# Patient Record
Sex: MALE | Race: OTHER | HISPANIC OR LATINO | URBAN - METROPOLITAN AREA
[De-identification: names, ages, dates, MRNs, and addresses within clinical notes are randomized per-mention and may not be internally consistent; named-entity substitution may affect disease eponyms.]

---
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
## 2021-03-02 NOTE — ED PROVIDER NOTE - SECONDARY DIAGNOSIS.
DOS: 3/2/21    Pt seen in consultation for Geovany Jones, present with Hx chronic sore throat for the past few years, felt at level of larynx and also under R jaw, becoming more severe, increasing hoarseness, symptoms worse after heavy voice use.No throat clearing or mucus. Pt still has tonsils. No acute throat infections. No allergies, but seasonal sinus infections, treated with OTC meds and sometimes ABx if ear is involved. No problems with breathing or swallowing. No asthma or DM. No GERD. + heavy voice use. No dust/irritant/cigarette smoke exposure, + pet exposure.     Past medical, family, and social history reviewed per electronic health record on today's date of service, and there were no changes.    Review of systems including hematologic/lymphatic, respiratory, cardiovascular, constitutional, musculoskeletal, mood, endocrine, eyes, gastrointestinal, genitourinary, integumentary, and neurological systems was negative.    3/2/2021    Vitals:    03/02/21 1216   BP: 118/60   Pulse: 68   Resp: 16       On exam, patient is alert, oriented x3, well-developed, well-nourished, in no apparent distress.  Patient is attentive and responds to questions appropriately.  Voice is not hoarse.    Overall appearance of the head and neck is normal.  Facial symmetry and movement are within normal limits bilaterally, and skin is warm and dry.    Extraocular movement is preserved.    Ears:  Normal pinnae, ear canals, and tympanic membranes bilaterally.  Normal motion of the TM (tympanic membrane) on pneumatic otoscopy.  No TMJ (temporomandibular joint) tenderness.  No mastoid process tenderness.  Speech reception within normal limits.    Nose:  Normal septum, bridge, and nasal mucosa.  No sinus tenderness on percussion.    Oral cavity:  Normal lips, gums, floor of mouth, buccal mucosa, tongue, and hard palate.    Oropharynx:  Normal soft palate, posterior pharyngeal wall, tonsillar fossae and tonsils.    Neck:  No abnormal  masses, thyroid masses or enlargement, lymphadenopathy, crepitus or tenderness.  Trachea midline.    Chest:  No respiratory distress, stridor, or wheezing.    Cardiovascular:  No peripheral edema, normal peripheral perfusion, no pallor.    Neurologic:  Cranial nerve function grossly within normal limits.    Procedure: Both nasal cavities were anesthetized with topical lidocaine and Afrin nasal spray and bilateral rhinoscopies and laryngoscopy were performed.     Posterior nasal cavity including nasopharynx, adenoid beds, and eustachian tube orifices were within normal limits.     Examination of the hypopharynx including base of tongue, epiglottis, aryepiglottic folds, false vocal cords, true vocal cords, pyriform sinuses, and posterior glottic processes were within normal limits.    Assessment and Plan: normal exam for pt with symptoms consistent with chronic laryngitis or voice overuse. Will order U/S neck to investigate, if negative consider speech Rx.   Contusion of left chest wall, initial encounter

## 2022-08-19 ENCOUNTER — EMERGENCY (EMERGENCY)
Facility: HOSPITAL | Age: 22
LOS: 1 days | Discharge: HOME | End: 2022-08-19
Attending: EMERGENCY MEDICINE
Payer: SELF-PAY

## 2022-08-19 VITALS
HEIGHT: 68 IN | TEMPERATURE: 101 F | OXYGEN SATURATION: 98 % | RESPIRATION RATE: 20 BRPM | WEIGHT: 111.99 LBS | HEART RATE: 95 BPM | SYSTOLIC BLOOD PRESSURE: 117 MMHG | DIASTOLIC BLOOD PRESSURE: 79 MMHG

## 2022-08-19 LAB
ALBUMIN SERPL ELPH-MCNC: 4.5 G/DL — SIGNIFICANT CHANGE UP (ref 3.3–5.2)
ALP SERPL-CCNC: 48 U/L — SIGNIFICANT CHANGE UP (ref 40–120)
ALT FLD-CCNC: 23 U/L — SIGNIFICANT CHANGE UP
ANION GAP SERPL CALC-SCNC: 13 MMOL/L — SIGNIFICANT CHANGE UP (ref 5–17)
AST SERPL-CCNC: 26 U/L — SIGNIFICANT CHANGE UP
BASOPHILS # BLD AUTO: 0.02 K/UL — SIGNIFICANT CHANGE UP (ref 0–0.2)
BASOPHILS NFR BLD AUTO: 0.2 % — SIGNIFICANT CHANGE UP (ref 0–2)
BILIRUB SERPL-MCNC: 0.3 MG/DL — LOW (ref 0.4–2)
BUN SERPL-MCNC: 10.2 MG/DL — SIGNIFICANT CHANGE UP (ref 8–20)
CALCIUM SERPL-MCNC: 9.7 MG/DL — SIGNIFICANT CHANGE UP (ref 8.4–10.5)
CHLORIDE SERPL-SCNC: 97 MMOL/L — LOW (ref 98–107)
CO2 SERPL-SCNC: 26 MMOL/L — SIGNIFICANT CHANGE UP (ref 22–29)
CREAT SERPL-MCNC: 1.03 MG/DL — SIGNIFICANT CHANGE UP (ref 0.5–1.3)
EGFR: 105 ML/MIN/1.73M2 — SIGNIFICANT CHANGE UP
EOSINOPHIL # BLD AUTO: 0.02 K/UL — SIGNIFICANT CHANGE UP (ref 0–0.5)
EOSINOPHIL NFR BLD AUTO: 0.2 % — SIGNIFICANT CHANGE UP (ref 0–6)
GLUCOSE SERPL-MCNC: 118 MG/DL — HIGH (ref 70–99)
HADV DNA SPEC QL NAA+PROBE: DETECTED
HCT VFR BLD CALC: 41.1 % — SIGNIFICANT CHANGE UP (ref 39–50)
HGB BLD-MCNC: 13.9 G/DL — SIGNIFICANT CHANGE UP (ref 13–17)
IMM GRANULOCYTES NFR BLD AUTO: 0.3 % — SIGNIFICANT CHANGE UP (ref 0–1.5)
LYMPHOCYTES # BLD AUTO: 0.74 K/UL — LOW (ref 1–3.3)
LYMPHOCYTES # BLD AUTO: 8 % — LOW (ref 13–44)
MCHC RBC-ENTMCNC: 30.4 PG — SIGNIFICANT CHANGE UP (ref 27–34)
MCHC RBC-ENTMCNC: 33.8 GM/DL — SIGNIFICANT CHANGE UP (ref 32–36)
MCV RBC AUTO: 89.9 FL — SIGNIFICANT CHANGE UP (ref 80–100)
MONOCYTES # BLD AUTO: 0.96 K/UL — HIGH (ref 0–0.9)
MONOCYTES NFR BLD AUTO: 10.3 % — SIGNIFICANT CHANGE UP (ref 2–14)
NEUTROPHILS # BLD AUTO: 7.51 K/UL — HIGH (ref 1.8–7.4)
NEUTROPHILS NFR BLD AUTO: 81 % — HIGH (ref 43–77)
PLATELET # BLD AUTO: 238 K/UL — SIGNIFICANT CHANGE UP (ref 150–400)
POTASSIUM SERPL-MCNC: 3.9 MMOL/L — SIGNIFICANT CHANGE UP (ref 3.5–5.3)
POTASSIUM SERPL-SCNC: 3.9 MMOL/L — SIGNIFICANT CHANGE UP (ref 3.5–5.3)
PROT SERPL-MCNC: 7.9 G/DL — SIGNIFICANT CHANGE UP (ref 6.6–8.7)
RAPID RVP RESULT: DETECTED
RBC # BLD: 4.57 M/UL — SIGNIFICANT CHANGE UP (ref 4.2–5.8)
RBC # FLD: 11.5 % — SIGNIFICANT CHANGE UP (ref 10.3–14.5)
S PYO DNA THROAT QL NAA+PROBE: DETECTED
SARS-COV-2 RNA SPEC QL NAA+PROBE: SIGNIFICANT CHANGE UP
SODIUM SERPL-SCNC: 136 MMOL/L — SIGNIFICANT CHANGE UP (ref 135–145)
WBC # BLD: 9.28 K/UL — SIGNIFICANT CHANGE UP (ref 3.8–10.5)
WBC # FLD AUTO: 9.28 K/UL — SIGNIFICANT CHANGE UP (ref 3.8–10.5)

## 2022-08-19 PROCEDURE — 96374 THER/PROPH/DIAG INJ IV PUSH: CPT

## 2022-08-19 PROCEDURE — 0225U NFCT DS DNA&RNA 21 SARSCOV2: CPT

## 2022-08-19 PROCEDURE — 96375 TX/PRO/DX INJ NEW DRUG ADDON: CPT

## 2022-08-19 PROCEDURE — 36415 COLL VENOUS BLD VENIPUNCTURE: CPT

## 2022-08-19 PROCEDURE — 80053 COMPREHEN METABOLIC PANEL: CPT

## 2022-08-19 PROCEDURE — 99284 EMERGENCY DEPT VISIT MOD MDM: CPT | Mod: 25

## 2022-08-19 PROCEDURE — 87651 STREP A DNA AMP PROBE: CPT

## 2022-08-19 PROCEDURE — 85025 COMPLETE CBC W/AUTO DIFF WBC: CPT

## 2022-08-19 PROCEDURE — 96361 HYDRATE IV INFUSION ADD-ON: CPT

## 2022-08-19 PROCEDURE — 99284 EMERGENCY DEPT VISIT MOD MDM: CPT

## 2022-08-19 PROCEDURE — 87798 DETECT AGENT NOS DNA AMP: CPT

## 2022-08-19 RX ORDER — PENICILLIN V POTASSIUM 250 MG
1 TABLET ORAL
Qty: 20 | Refills: 0
Start: 2022-08-19 | End: 2022-08-28

## 2022-08-19 RX ORDER — IBUPROFEN 200 MG
600 TABLET ORAL ONCE
Refills: 0 | Status: COMPLETED | OUTPATIENT
Start: 2022-08-19 | End: 2022-08-19

## 2022-08-19 RX ORDER — ONDANSETRON 8 MG/1
4 TABLET, FILM COATED ORAL ONCE
Refills: 0 | Status: COMPLETED | OUTPATIENT
Start: 2022-08-19 | End: 2022-08-19

## 2022-08-19 RX ORDER — PENICILLIN V POTASSIUM 250 MG
500 TABLET ORAL ONCE
Refills: 0 | Status: COMPLETED | OUTPATIENT
Start: 2022-08-19 | End: 2022-08-19

## 2022-08-19 RX ORDER — DEXAMETHASONE 0.5 MG/5ML
10 ELIXIR ORAL ONCE
Refills: 0 | Status: COMPLETED | OUTPATIENT
Start: 2022-08-19 | End: 2022-08-19

## 2022-08-19 RX ORDER — SODIUM CHLORIDE 9 MG/ML
1000 INJECTION INTRAMUSCULAR; INTRAVENOUS; SUBCUTANEOUS ONCE
Refills: 0 | Status: COMPLETED | OUTPATIENT
Start: 2022-08-19 | End: 2022-08-19

## 2022-08-19 RX ADMIN — Medication 500 MILLIGRAM(S): at 17:54

## 2022-08-19 RX ADMIN — SODIUM CHLORIDE 1000 MILLILITER(S): 9 INJECTION INTRAMUSCULAR; INTRAVENOUS; SUBCUTANEOUS at 16:36

## 2022-08-19 RX ADMIN — Medication 102 MILLIGRAM(S): at 17:56

## 2022-08-19 RX ADMIN — Medication 600 MILLIGRAM(S): at 16:35

## 2022-08-19 RX ADMIN — ONDANSETRON 4 MILLIGRAM(S): 8 TABLET, FILM COATED ORAL at 16:36

## 2022-08-19 NOTE — ED ADULT NURSE NOTE - NSIMPLEMENTINTERV_GEN_ALL_ED
No
Implemented All Universal Safety Interventions:  [place] to call system. Call bell, personal items and telephone within reach. Instruct patient to call for assistance. Room bathroom lighting operational. Non-slip footwear when patient is off stretcher. Physically safe environment: no spills, clutter or unnecessary equipment. Stretcher in lowest position, wheels locked, appropriate side rails in place.

## 2022-08-19 NOTE — ED PROVIDER NOTE - PROGRESS NOTE DETAILS
+ Strep and COVID19  negative. Rest of Pt Labs are within acceptable limits.  treated for his Strep and Rx sent to his Pharmacy. Pt D/C in stable condition. F/U with PCP.

## 2022-08-19 NOTE — ED ADULT TRIAGE NOTE - CCCP TRG CHIEF CMPLNT
BLOOD PRESSURE: DO YOU KNOW YOUR NUMBERS?  The main points  • High blood pressure often has no symptoms. Many people do not know they have it.  • If not treated, high blood pressure can lead to stroke, heart disease, kidney disease and many other health problems.  • You can take steps to keep your blood   pressure in control, such as losing extra pounds, staying active, and limiting salt (sodium) and alcohol.  What do the numbers mean?  Blood pressure is the pressure inside the arteries (blood vessels). It is measured in two numbers, such as 110/70 (110 over 70).  • The first or top number (systolic pressure) is the highest pressure in the arteries when the heart pumps blood into them.  • The second or bottom number (diastolic pressure) is the lowest pressure when the heart rests to refill between beats.  When is blood pressure too high?  Look at the chart below. If your blood pressure stays below 120/80 at rest, you are in a healthy range. If your numbers are higher at rest, you’ll want to discuss this with your health care provider.  What causes high blood pressure?  In most cases, we don’t know the cause. But we do know that some risk factors are linked to high blood pressure.        Why it is important to know my risk factors?  Some risk factors can be controlled with lifestyle changes. Knowing your risk factors is the first step in preventing and controlling high blood pressure. Risk factors include:  • Age - The older you are, the greater your chance of getting high blood pressure.  • Heredity - High blood pressure can run in the family.  • Race - -Americans, for example, are more likely to have high blood pressure.  • Overweight - People who are overweight or obese are more likely to get high blood pressure.  • Salt intake - Too much sodium (salt) in the diet can increase blood pressure in some people.  • Alcohol intake - Too much alcohol can increase blood pressure.  • Little  exercise - Lack of exercise can lead to being overweight and increase your risk for high blood pressure.  • Tobacco use - Smoking increases your blood pressure within the first few minutes of use.  • Diabetes or other medical conditions - Seven out of 10 people with diabetes have high blood pressure. High cholesterol, kidney disease and  sleep apnea also make you more likely to have high blood pressure.  • Stress - Stress is hard to measure, but increased stress can lead to high blood pressure.   Adults 18 years and older: What do your numbers mean?  When your blood pressure  stays below 120/80 at rest You have a healthy blood pressure. A healthy lifestyle will help you keep it this way.   When your blood pressure  stays between 120/80 and  139/89 at rest ... You are at increased risk for health effects from high blood pressure. Your health care provider may ask you to make changes in your diet, activity or other lifestyle habits.   When your blood pressure  stays over 140/90 at rest...     This is more serious and may require additional treatment (including medications) and lifestyle changes. The higher your blood pressure, the greater your risk for wear and tear on your arteries, heart disease and other health problems.   Can high blood pressure be cured?  In most cases, no. High blood pressure must be watched and treated for your whole life. Knowing your risk factors is the first step in preventing and controlling high blood pressure. With routine checks, high blood pressure can be found and treated early. Early treatment can reduce or   prevent the problems high blood pressure can cause (stroke, heart or kidney disease).  How is it treated?  To help control your blood pressure, your healthcare provider may prescribe one or more of the following:  • Weight control - A new meal plan may be the first step to control your blood pressure. Losing weight can also help you control cholesterol and diabetes.  • Limit  sodium (salt) - Most of us eat much more salt than we need. You may be advised to avoid salty foods and cut down on salt in cooking and at the table.  • Limit alcohol - Ask your provider how much alcohol you can safely have.  • Activity - Even a moderate amount of activity can help you control blood pressure, weight, cholesterol, diabetes and stress. Ask your health care provider to help you get started.  • Medication - If you are given a medication to help control your blood pressure, be sure to take it as prescribed. Don’t stop taking it on your own, even if you feel great. If you have side effects or  concerns about taking it, talk with your health care provider.  • Stop smoking - Cigarette smoking can increase blood pressure. It is also a major risk factor for heart disease.  • Manage your stress - High levels of stress can help lead to high blood pressure. If your blood pressure is already high, too much stress can make it worse. high blood pressure:  • Your blood pressure should be checked at every office visit. For healthy men and women, these checks should be done at least every two years. More frequent checks are needed for people at risk  or who have had high blood pressure in the past. Ask your provider if you should check your blood pressure at home between visits.  • Work with your provider on a plan for making the lifestyle changes that can help control your blood pressure (control weight, stop smoking, use less salt and alcohol, exercise, manage stress).  • Take medications exactly as prescribed and tell your provider if you have problems taking your medicine (side effects, trouble affording prescriptions).  Discuss any questions you have with your provider. Write your questions down and take them to your next visit. Here are some questions you might want to ask:  • How often should my blood pressure be checked?  • Should I check my blood pressure at home?  Is it okay to use walk-up blood pressure  machines in stores?  • What do I need to know about my medication?  What if I forget to take it? How long will I need to take it?  • How much salt is OK for me?  • How much alcohol is OK for me?  • How much exercise is safe for me?  More information on the Internet  The American Heart Association  (www.americanheart.org) is a good source  of blood pressure information, including a  “Blood Pressure Risk Calculator” that can  help you estimate your risk.  [id number] (       Medicare Wellness Visit  Plan for Preventive Care    A good way for you to stay healthy is to use preventive care.  Medicare covers many services that can help you stay healthy.* The goal of these services is to find any health problems as quickly as possible. Finding problems early can help make them easier to treat.  Your personal plan below lists the services you may need and when they are due.     Health Maintenance Summary     Medicare Wellness 65+ (Yearly)  Due since 10/31/2019    DTaP/Tdap/Td Vaccine (2 - Td)  Next due on 4/5/2022    Pneumococcal Vaccine 65+   Completed    Shingles Vaccine   Completed    Influenza Vaccine   Completed    Meningococcal Vaccine   Aged Out           Preventive Care for Women and Men    Heart Screenings (Cardiovascular):  · Blood tests are used to check your cholesterol, lipid and triglyceride levels. High levels can increase your risk for heart disease and stroke. High levels can be treated with medications, diet and exercise. Lowering your levels can help keep your heart and blood vessels healthy.  Your provider will order these tests if they are needed.    · An ultrasound is done to see if you have an abdominal aortic aneurysm (AAA).  This is an enlargement of one of the main blood vessels that delivers blood to the body.   In the [place], 9,000 deaths are caused by AAA.  You may not even know you have this problem and as many as 1 in 3 people will have a serious problem if it is not treated.  Early  diagnosis allows for more effective treatment and cure.  If you have a family history of AAA or are a male age 65-75 who has smoked, you are at higher risk of an AAA.  Your provider can order this test, if needed.    Colorectal Screening:  · There are many tests that are used to check for cancer of your colon and rectum. You and your provider should discuss what test is best for you and when to have it done.  Options include:  · Screening Colonoscopy: exam of the entire colon, seen through a flexible lighted tube.  · Flexible Sigmoidoscopy: exam of the last third (sigmoid portion) of the colon and rectum, seen through a flexible lighted tube.  · Cologuard DNA stool test: a sample of your stool is used to screen for cancer and unseen blood in your stool.  · Fecal Occult Blood Test: a sample of your stool is studied to find any unseen blood    Flu Shot:  · An immunization that helps to prevent influenza (the flu). You should get this every year. The best time to get the shot is in the fall.    Pneumococcal Shot:  • Vaccines are available that can help prevent pneumococcal disease, which is any type of infection caused by Streptococcus pneumoniae bacteria.   Their use can prevent some cases of pneumonia, meningitis, and sepsis. There are two types of pneumococcal vaccines:   o Conjugate vaccines (PCV-13 or Prevnar 13®) - helps protect against the 13 types of pneumococcal bacteria that are the most common causes of serious infections in children and adults.    o Polysaccharide vaccine (PPSV23 or [id number]®) - helps protect against 23 types of pneumococcal bacteria for patients who are recommended to get it.  These vaccines should be given at least 12 months apart.  A booster is usually not needed.     Hepatitis B Shot:  · An immunization that helps to protect people from getting Hepatitis B. Hepatitis B is a virus that spreads through contact with infected blood or body fluids. Many people with the virus do not have  symptoms.  The virus can lead to serious problems, such as liver disease. Some people are at higher risk than others. Your doctor will tell you if you need this shot.     Diabetes Screening:  · A test to measure sugar (glucose) in your blood is called a fasting blood sugar. Fasting means you cannot have food or drink for at least 8 hours before the test. This test can detect diabetes long before you may notice symptoms.    Glaucoma Screening:  · Glaucoma screening is performed by your eye doctor. The test measures the fluid pressure inside your eyes to determine if you have glaucoma.     Hepatitis C Screening:  · A blood test to see if you have the hepatitis C virus.  Hepatitis C attacks the liver and is a major cause of chronic liver disease.  Medicare will cover a single screening for all adults born between 1945 & 1965, or high risk patients (people who have injected illegal drugs or people who have had blood transfusions).  High risk patients who continue to inject illegal drugs can be screened for Hepatitis C every year.    Smoking and Tobacco-Use Cessation Counseling:  · Tobacco is the single greatest cause of disease and early death in our country today. Medication and counseling together can increase a person’s chance of quitting for good.   · Medicare covers two quitting attempts per year, with four counseling sessions per attempt (eight sessions in a 12 month period)    Preventive Screening tests for Women    Screening Mammograms and Breast Exams:  · An x-ray of your breasts to check for breast cancer before you or your doctor may be able to feel it.  If breast cancer is found early it can usually be treated with success.    Pelvic Exams and Pap Tests:  · An exam to check for cervical and vaginal cancer. A Pap test is a lab test in which cells are taken from your cervix and sent to the lab to look for signs of cervical cancer. If cancer of the cervix is found early, chances for a cure are good. Testing can  fever generally end at age 65, or if a woman has a hysterectomy for a benign condition. Your provider may recommend more frequent testing if certain abnormal results are found.    Bone Mass Measurements:  · A painless x-ray of your bone density to see if you are at risk for a broken bone. Bone density refers to the thickness of bones or how tightly the bone tissue is packed.    Preventive Screening tests for Men    Prostate Screening:  · Should you have a prostate cancer test (PSA)?  It is up to you to decide if you want a prostate cancer test. Talk to your clinician to find out if the test is right for you.  Things for you to consider and talk about should include:  · Benefits and harms of the test  · Your family history  · How your race/ethnicity may influence the test  · If the test may impact other medical conditions you have  · Your values on screenings and treatments    *Medicare pays for many preventive services to keep you healthy. For some of these services, you might have to pay a deductible, coinsurance, and / or copayment.  The amounts vary depending on the type of services you need and the kind of Medicare health plan you have.

## 2022-08-19 NOTE — ED PROVIDER NOTE - PATIENT PORTAL LINK FT
You can access the FollowMyHealth Patient Portal offered by [facility] by registering at the following website: [email or web address]. By joining [facility]’s FollowMyHealth portal, you will also be able to view your health information using other applications (apps) compatible with our system.

## 2022-08-19 NOTE — ED PROVIDER NOTE - CLINICAL SUMMARY MEDICAL DECISION MAKING FREE TEXT BOX
22 yr old M presented to ED for fever, throat pain and body aches. Pt sates he have been having symptoms for the last 2-3 days. Pt admits top pain with eating and drinking but no difficulty swallow. Examination + throat redness and mils exudates. Pt treated and D/C home.

## 2022-08-19 NOTE — ED PROVIDER NOTE - NSFOLLOWUPINSTRUCTIONS_ED_ALL_ED_FT
Continue with Penicillin V twice daily as prescribed   Warm Water gargle as 3-4 times a days  Ibuprofen for pain every 6 hrs.  Followup with [facility]

## 2022-08-19 NOTE — ED PROVIDER NOTE - OBJECTIVE STATEMENT
22 yr old M presented to ED for fever, throat pain and body aches. Pt sates he have been having symptoms for the last 2-3 days. Pt admits top pain with eating and drinking but no difficulty swallow. Pt admits to been vaccinated for  COVID19. Pt also admits to not taking any pain meds for his pain. Pt denies any other issues at this time. Pt denies any significant past medical or surgical illnesses.

## 2022-08-19 NOTE — ED ADULT NURSE NOTE - OBJECTIVE STATEMENT
Pt is alert and oriented. Pt states that he has been having fevers, sore throat and n/v for 4 days. Pt has throat redness. Pt unable to keep any food down. Pt denies sob, chest pain, and dizziness. Pt resp are even and unlabored, skin color giovanny for race. Pt updated on plan of care.

## 2024-06-24 ENCOUNTER — EMERGENCY (EMERGENCY)
Facility: HOSPITAL | Age: 24
LOS: 1 days | End: 2024-06-24
Attending: EMERGENCY MEDICINE
Payer: SELF-PAY

## 2024-06-24 VITALS
SYSTOLIC BLOOD PRESSURE: 113 MMHG | HEART RATE: 81 BPM | RESPIRATION RATE: 17 BRPM | TEMPERATURE: 98 F | DIASTOLIC BLOOD PRESSURE: 77 MMHG | OXYGEN SATURATION: 98 %

## 2024-06-24 PROCEDURE — 96372 THER/PROPH/DIAG INJ SC/IM: CPT

## 2024-06-24 PROCEDURE — 73610 X-RAY EXAM OF ANKLE: CPT

## 2024-06-24 PROCEDURE — 73590 X-RAY EXAM OF LOWER LEG: CPT

## 2024-06-24 PROCEDURE — 99284 EMERGENCY DEPT VISIT MOD MDM: CPT

## 2024-06-24 PROCEDURE — 73630 X-RAY EXAM OF FOOT: CPT

## 2024-06-24 PROCEDURE — 99053 MED SERV 10PM-8AM 24 HR FAC: CPT

## 2024-06-24 RX ORDER — KETOROLAC TROMETHAMINE 30 MG/ML
30 INJECTION, SOLUTION INTRAMUSCULAR ONCE
Refills: 0 | Status: DISCONTINUED | OUTPATIENT
Start: 2024-06-24 | End: 2024-06-24

## 2024-06-24 RX ADMIN — KETOROLAC TROMETHAMINE 30 MILLIGRAM(S): 30 INJECTION, SOLUTION INTRAMUSCULAR at 23:45

## 2024-06-25 PROCEDURE — 73610 X-RAY EXAM OF ANKLE: CPT | Mod: 26,RT

## 2024-06-25 PROCEDURE — 73630 X-RAY EXAM OF FOOT: CPT | Mod: 26,RT

## 2024-06-25 PROCEDURE — 73590 X-RAY EXAM OF LOWER LEG: CPT | Mod: 26,RT
